# Patient Record
Sex: MALE | Race: WHITE
[De-identification: names, ages, dates, MRNs, and addresses within clinical notes are randomized per-mention and may not be internally consistent; named-entity substitution may affect disease eponyms.]

---

## 2018-01-11 ENCOUNTER — HOSPITAL ENCOUNTER (EMERGENCY)
Dept: HOSPITAL 25 - ED | Age: 48
Discharge: HOME | End: 2018-01-11
Payer: COMMERCIAL

## 2018-01-11 VITALS — DIASTOLIC BLOOD PRESSURE: 87 MMHG | SYSTOLIC BLOOD PRESSURE: 146 MMHG

## 2018-01-11 DIAGNOSIS — R07.81: ICD-10-CM

## 2018-01-11 DIAGNOSIS — R07.9: Primary | ICD-10-CM

## 2018-01-11 LAB
BASOPHILS # BLD AUTO: 0.1 10^3/UL (ref 0–0.2)
EOSINOPHIL # BLD AUTO: 0.1 10^3/UL (ref 0–0.6)
HCT VFR BLD AUTO: 47 % (ref 42–52)
HGB BLD-MCNC: 16.4 G/DL (ref 14–18)
INR PPP/BLD: 1.11 (ref 0.77–1.02)
LYMPHOCYTES # BLD AUTO: 1.2 10^3/UL (ref 1–4.8)
MCH RBC QN AUTO: 30 PG (ref 27–31)
MCHC RBC AUTO-ENTMCNC: 35 G/DL (ref 31–36)
MCV RBC AUTO: 86 FL (ref 80–94)
MONOCYTES # BLD AUTO: 1.2 10^3/UL (ref 0–0.8)
NEUTROPHILS # BLD AUTO: 9.1 10^3/UL (ref 1.5–7.7)
NRBC # BLD AUTO: 0.1 10^3/UL
NRBC BLD QL AUTO: 0.4
PLATELET # BLD AUTO: 203 10^3/UL (ref 150–450)
RBC # BLD AUTO: 5.51 10^6/UL (ref 4–5.4)
WBC # BLD AUTO: 11.7 10^3/UL (ref 3.5–10.8)

## 2018-01-11 PROCEDURE — 83605 ASSAY OF LACTIC ACID: CPT

## 2018-01-11 PROCEDURE — 83735 ASSAY OF MAGNESIUM: CPT

## 2018-01-11 PROCEDURE — 84484 ASSAY OF TROPONIN QUANT: CPT

## 2018-01-11 PROCEDURE — 83690 ASSAY OF LIPASE: CPT

## 2018-01-11 PROCEDURE — 85610 PROTHROMBIN TIME: CPT

## 2018-01-11 PROCEDURE — 80053 COMPREHEN METABOLIC PANEL: CPT

## 2018-01-11 PROCEDURE — 85730 THROMBOPLASTIN TIME PARTIAL: CPT

## 2018-01-11 PROCEDURE — 36415 COLL VENOUS BLD VENIPUNCTURE: CPT

## 2018-01-11 PROCEDURE — 86140 C-REACTIVE PROTEIN: CPT

## 2018-01-11 PROCEDURE — 93005 ELECTROCARDIOGRAM TRACING: CPT

## 2018-01-11 PROCEDURE — 99283 EMERGENCY DEPT VISIT LOW MDM: CPT

## 2018-01-11 PROCEDURE — 96361 HYDRATE IV INFUSION ADD-ON: CPT

## 2018-01-11 PROCEDURE — 71045 X-RAY EXAM CHEST 1 VIEW: CPT

## 2018-01-11 PROCEDURE — 96374 THER/PROPH/DIAG INJ IV PUSH: CPT

## 2018-01-11 PROCEDURE — 85379 FIBRIN DEGRADATION QUANT: CPT

## 2018-01-11 PROCEDURE — 85025 COMPLETE CBC W/AUTO DIFF WBC: CPT

## 2018-01-11 PROCEDURE — 71275 CT ANGIOGRAPHY CHEST: CPT

## 2018-01-11 PROCEDURE — 84443 ASSAY THYROID STIM HORMONE: CPT

## 2018-01-11 PROCEDURE — 83880 ASSAY OF NATRIURETIC PEPTIDE: CPT

## 2018-01-11 NOTE — ED
Bashir FERNANDES Stephanie, scribed for Mendoza Mcbride MD on 01/11/18 at 1118 .





Complex/Multi-Sys Presentation





- HPI Summary


HPI Summary: 


The pt is a 46 y/o M presenting to the ED with c/o R rib pain that began 

yesterday. The pain radiates to his R shoulder and is aggravated by lying down, 

sitting up, taking a deep breath and moving. The pt describes the pain to feel 

like as if he pulled something. The pt denies calf pain, calf swelling, rash, 

fever, middle back pain, abd pain, GI/ urinary symptoms, chills and cough. 

Alleviating factors include raising the R arm above the head and Percocet. 








- History Of Current Complaint


Chief Complaint: EDChestWallPain


Time Seen by Provider: 01/11/18 10:39


Hx Obtained From: Patient


Onset/Duration: Lasting Days - 1, Still Present


Timing: Constant


Severity Currently: Mild


Location: Radiates To: - R shoulder


Aggravating Factor(s): lying down, sitting up, taking a deep breath and moving


Alleviating Factor(s): raising the R arm above the head and Percocet.


Associated Signs And Symptoms: Positive: Other - Negative: calf pain, calf 

swelling, rash, middle back pain, GI/ urinary symptoms, and chills.  Negative: 

Cough, Abdominal Pain, Fever





- Allergies/Home Medications


Allergies/Adverse Reactions: 


 Allergies











Allergy/AdvReac Type Severity Reaction Status Date / Time


 


No Known Allergies Allergy   Verified 01/11/18 10:25














PMH/Surg Hx/FS Hx/Imm Hx


Endocrine/Hematology History: 


   Denies: Hx Diabetes, Hx Thyroid Disease


Cardiovascular History: 


   Denies: Hx Hypertension


Respiratory History: 


   Denies: Hx Asthma, Hx Chronic Obstructive Pulmonary Disease (COPD)


GI History: 


   Denies: Hx Ulcer


EENT History: 


   Denies: Hx Deafness





- Surgical History


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 

x2.  Cholecystectomy years ago


Infectious Disease History: No


Infectious Disease History: 


   Denies: Hx Hepatitis, Hx Human Immunodeficiency Virus (HIV), Traveled 

Outside the [place] in Last 30 Days





- Family History


Known Family History: Positive: Cardiac Disease, Hypertension, Diabetes, Other 

- colon cancer, aortic aneurism





- Social History


Occupation: Employed Part-time


Lives: With Family


Alcohol Use: Occasionally


Substance Use Type: Reports: None


Smoking Status (MU): Never Smoked Tobacco





Review of Systems


Negative: Fever, Chills


Negative: Cough


Positive: no symptoms reported


Positive: Other - R rib pain that radiates to R shoulder. Negative: middle back 

pain.  Negative: Edema - Neg: calf pain, calf swelling. 


Negative: Rash


All Other Systems Reviewed And Are Negative: Yes





Physical Exam





- Summary


Physical Exam Summary: 


General: well-appearing, mild pain distress that becomes moderate with 

movement. 


Skin: warm, color reflects adequate perfusion, dry


Head: normal


Eyes: EOMI, OPAL


ENT: normal


Neck: supple, nontender


Respiratory: CTA, breath sounds present


Cardiovascular: RRR


Abdomen: soft, nontender


Bowel: present


Musculoskeletal: strength/ROM intact, tender to palpation over R lower ribs


Neurological: normal, sensory/motor intact, A&O x3


Psychological: affect/mood appropriate





Triage Information Reviewed: Yes


Vital Signs On Initial Exam: 


 Initial Vitals











Temp Pulse Resp BP Pulse Ox


 


 98.2 F   103   19   162/88   96 


 


 01/11/18 10:13  01/11/18 10:13  01/11/18 10:13  01/11/18 10:13  01/11/18 10:13











Vital Signs Reviewed: Yes





- Lana Coma Scale


Coma Scale Total: 15





Diagnostics





- Vital Signs


 Vital Signs











  Temp Pulse Resp BP Pulse Ox


 


 01/11/18 10:58      95


 


 01/11/18 10:27   105  18   95


 


 01/11/18 10:24     144/89 


 


 01/11/18 10:13  98.2 F  103  19  162/88  96














- Laboratory


Lab Results: 


 Lab Results











  01/11/18 01/11/18 01/11/18 Range/Units





  10:57 10:57 10:57 


 


WBC     (3.5-10.8)  10^3/ul


 


RBC     (4.0-5.4)  10^6/ul


 


Hgb     (14.0-18.0)  g/dl


 


Hct     (42-52)  %


 


MCV     (80-94)  fL


 


MCH     (27-31)  pg


 


MCHC     (31-36)  g/dl


 


RDW     (10.5-15)  %


 


Plt Count     (150-450)  10^3/ul


 


MPV     (7.4-10.4)  um3


 


Neut % (Auto)     (38-83)  %


 


Lymph % (Auto)     (25-47)  %


 


Mono % (Auto)     (1-9)  %


 


Eos % (Auto)     (0-6)  %


 


Baso % (Auto)     (0-2)  %


 


Absolute Neuts (auto)     (1.5-7.7)  10^3/ul


 


Absolute Lymphs (auto)     (1.0-4.8)  10^3/ul


 


Absolute Monos (auto)     (0-0.8)  10^3/ul


 


Absolute Eos (auto)     (0-0.6)  10^3/ul


 


Absolute Basos (auto)     (0-0.2)  10^3/ul


 


Absolute Nucleated RBC     10^3/ul


 


Nucleated RBC %     


 


INR (Anticoag Therapy)   1.11 H   (0.77-1.02)  


 


APTT   32.0   (26.0-36.3)  seconds


 


D-Dimer, Quantitative   < 200   (Less Than 230)  ng/mL


 


Sodium  138    (133-145)  mmol/L


 


Potassium  3.9    (3.5-5.0)  mmol/L


 


Chloride  105    (101-111)  mmol/L


 


Carbon Dioxide  24    (22-32)  mmol/L


 


Anion Gap  9    (2-11)  mmol/L


 


BUN  18    (6-24)  mg/dL


 


Creatinine  0.92    (0.67-1.17)  mg/dL


 


Est GFR ( Amer)  112.5    (>60)  


 


Est GFR (Non-Af Amer)  87.4    (>60)  


 


BUN/Creatinine Ratio  19.6    (8-20)  


 


Glucose  124 H    ()  mg/dL


 


Lactic Acid     (0.5-2.0)  mmol/L


 


Calcium  10.1    (8.6-10.3)  mg/dL


 


Magnesium  2.1    (1.9-2.7)  mg/dL


 


Total Bilirubin  0.70    (0.2-1.0)  mg/dL


 


AST  20    (13-39)  U/L


 


ALT  44    (7-52)  U/L


 


Alkaline Phosphatase  76    ()  U/L


 


Troponin I  0.01    (<0.04)  ng/mL


 


C-Reactive Protein  28.88 H    (< 5.00)  mg/L


 


B-Natriuretic Peptide    20 ( - 100) pg/mL


 


Total Protein  7.4    (6.4-8.9)  g/dL


 


Albumin  4.5    (3.2-5.2)  g/dL


 


Globulin  2.9    (2-4)  g/dL


 


Albumin/Globulin Ratio  1.6    (1-3)  


 


Lipase  33    (11.0-82.0)  U/L


 


TSH  5.54    (0.34-5.60)  mcIU/mL














  01/11/18 01/11/18 Range/Units





  10:57 10:57 


 


WBC  11.7 H   (3.5-10.8)  10^3/ul


 


RBC  5.51 H   (4.0-5.4)  10^6/ul


 


Hgb  16.4   (14.0-18.0)  g/dl


 


Hct  47   (42-52)  %


 


MCV  86   (80-94)  fL


 


MCH  30   (27-31)  pg


 


MCHC  35   (31-36)  g/dl


 


RDW  14   (10.5-15)  %


 


Plt Count  203   (150-450)  10^3/ul


 


MPV  8   (7.4-10.4)  um3


 


Neut % (Auto)  77.7   (38-83)  %


 


Lymph % (Auto)  10.3 L   (25-47)  %


 


Mono % (Auto)  10.1 H   (1-9)  %


 


Eos % (Auto)  0.8   (0-6)  %


 


Baso % (Auto)  1.1   (0-2)  %


 


Absolute Neuts (auto)  9.1 H   (1.5-7.7)  10^3/ul


 


Absolute Lymphs (auto)  1.2   (1.0-4.8)  10^3/ul


 


Absolute Monos (auto)  1.2 H   (0-0.8)  10^3/ul


 


Absolute Eos (auto)  0.1   (0-0.6)  10^3/ul


 


Absolute Basos (auto)  0.1   (0-0.2)  10^3/ul


 


Absolute Nucleated RBC  0.1   10^3/ul


 


Nucleated RBC %  0.4   


 


INR (Anticoag Therapy)    (0.77-1.02)  


 


APTT    (26.0-36.3)  seconds


 


D-Dimer, Quantitative    (Less Than 230)  ng/mL


 


Sodium    (133-145)  mmol/L


 


Potassium    (3.5-5.0)  mmol/L


 


Chloride    (101-111)  mmol/L


 


Carbon Dioxide    (22-32)  mmol/L


 


Anion Gap    (2-11)  mmol/L


 


BUN    (6-24)  mg/dL


 


Creatinine    (0.67-1.17)  mg/dL


 


Est GFR ( Amer)    (>60)  


 


Est GFR (Non-Af Amer)    (>60)  


 


BUN/Creatinine Ratio    (8-20)  


 


Glucose    ()  mg/dL


 


Lactic Acid   1.7  (0.5-2.0)  mmol/L


 


Calcium    (8.6-10.3)  mg/dL


 


Magnesium    (1.9-2.7)  mg/dL


 


Total Bilirubin    (0.2-1.0)  mg/dL


 


AST    (13-39)  U/L


 


ALT    (7-52)  U/L


 


Alkaline Phosphatase    ()  U/L


 


Troponin I    (<0.04)  ng/mL


 


C-Reactive Protein    (< 5.00)  mg/L


 


B-Natriuretic Peptide   ( - 100) pg/mL


 


Total Protein    (6.4-8.9)  g/dL


 


Albumin    (3.2-5.2)  g/dL


 


Globulin    (2-4)  g/dL


 


Albumin/Globulin Ratio    (1-3)  


 


Lipase    (11.0-82.0)  U/L


 


TSH    (0.34-5.60)  mcIU/mL











Result Diagrams: 


 01/11/18 10:57





 01/11/18 10:57


Lab Statement: Any lab studies that have been ordered have been reviewed, and 

results considered in the medical decision making process.





- Radiology


  ** CXR


Xray Interpretation: Positive (See Comments)


Radiology Interpretation Completed By: Radiologist - EXPIRATORY EXAM, SMALL 

BIBASILAR INFILTRATES.





- CT


  ** CTA Chest/Thorax


CT Interpretation: Positive (See Comments)


CT Interpretation Completed By: Radiologist -  1. Moderately limited CT 

pulmonary angiogram due to suboptimal opacification of the pulmonary arteries 

and motion artifact. No compelling evidence for pulmonary embolism at the main 

through segmental pulmonary arteries. If clinically indicated the exam could be 

repeated. 2. Alveolar consolidation at the LEFT lung base is most suspicious 

for atelectasis given volume loss. Inflammatory infiltrate is not excluded. 3. 

Fatty infiltration of the liver.





- EKG


  ** 10:58


EKG Rhythm: Sinus Tachycardia - 100 BPM


ST Segment: Normal


Ectopy: None


EKG Interpretation: Borderline prolonged QT interval.  





Complex Multi-Symp Course/Dx


Course Of Treatment: Medications reviewed.  PAIN IMPROVED IN ED AFTER IV 

TORADOL.  DISCUSSED RESULTS WITH PATIENT AND HIS WIFE.  WILL TREAT FOR POSSIBLE 

BRONCHITIS.  F/U PMD; RETURN IF WORSE.





- Diagnoses


Provider Diagnoses: 


 Chest pain








Discharge





- Discharge Plan


Condition: Stable


Disposition: HOME


Prescriptions: 


Azithromyxin CESAR (NF) [Z-Cesar (Zithromax) 250 mg tabs #6] 2 tab PO .TODAY, THEN 

1 DAILY #6 tab


oxyCODONE/Acetamin 5/325 MG* [Percocet 5/325 TAB*] 1 tab PO Q4H PRN #30 tab MDD 

6


 PRN Reason: Pain


Patient Education Materials:  Chest Pain (ED)


Referrals: 


Liu Alba MD [Primary Care Provider] - 


Additional Instructions: 


FOLLOW UP WITH YOUR DOCTOR.


RETURN TO THE EMERGENCY DEPARTMENT FOR ANY WORSENING OF YOUR CONDITION; PAIN, 

SHORTNESS OF BREATH, YOU FEEL ILL OR QUESTIONS OR CONCERNS.





The documentation as recorded by the Bashir leong Stephanie accurately reflects 

the service I personally performed and the decisions made by me, Mendoza Mcbride MD.

## 2018-01-11 NOTE — RAD
INDICATION: Splinting RIGHT rib/chest pain. Assess for PE.



COMPARISON: Chest radiograph of the same date and December 22, 2015 CT abdomen.



TECHNIQUE: Multidetector CT images were obtained from the lung apices to the upper abdomen

with 80 mL Omnipaque 350 IV contrast.  Pulmonary angiogram protocol. Multiplanar

reformation including with maximum intensity projection.



REPORT: Low lung volumes with subsegmental atelectasis. More confluent airspace

consolidation at the peripheral posterior aspect of the lingula and basilar segments of

the LEFT lower lobe is less specific. Negative for pleural effusions. Negative for

pneumothorax.



Negative for lymphadenopathy, cardiomegaly, or pericardial effusion. Normal diameter

thoracic aorta.



Suboptimal opacification of the pulmonary arteries due to late phase of enhancement and

motion artifact limits the CT pulmonary angiogram. There are no filling defects within the

main through the segmental pulmonary arteries to indicate pulmonary embolism. The

subsegmental pulmonary arteries largely cannot be assessed.



Images through the upper abdomen are remarkable for fatty infiltration of the liver and

prior cholecystectomy.



Negative for suspicious osseous lesions.



IMPRESSION: 

1. Moderately limited CT pulmonary angiogram due to suboptimal opacification of the

pulmonary arteries and motion artifact. No compelling evidence for pulmonary embolism at

the main through segmental pulmonary arteries. If clinically indicated the exam could be

repeated.

2. Alveolar consolidation at the LEFT lung base is most suspicious for atelectasis given

volume loss. Inflammatory infiltrate is not excluded.

3. Fatty infiltration of the liver.

## 2018-01-11 NOTE — RAD
INDICATION:  Right chest splinting, chest pain.



COMPARISON:  Comparison is made with a prior study from December 22, 2015.



TECHNIQUE: 2 portable films of the chest were obtained.



FINDINGS: Cardiac and mediastinal contours appear to be within normal limits.



The lungs are underinflated, there are small infiltrates at both lung bases.



IMPRESSION:  EXPIRATORY EXAM, SMALL BIBASILAR INFILTRATES.